# Patient Record
(demographics unavailable — no encounter records)

---

## 2025-03-18 NOTE — PLAN
[FreeTextEntry1] : Check labs as above. He would like to check his testosterone level.  Will try Propranolol for PRN use for the physical symptoms of anxiety.  Reviewed age-appropriate preventive screening tests with patient.  Discussed clean eating (eg Mediterranean style eating plan) and regular exercise/staying as physically active as possible.  Include balance exercises and strength training and core strengthening exercises for bone health and to decrease risk for falls.  Reviewed importance of good self care (e.g. meditation, yoga, adequate rest, regular exercise, magnesium, clean eating, etc.).  Follow up for next physical in one to two years.

## 2025-03-18 NOTE — ASSESSMENT
[FreeTextEntry1] : SHANDA KIM is a 24 year old male here for a physical exam.  He has a history of anxiety. He was taking Sertraline in the past but has stopped. He does not feel that he needs a daily medication. He is not interested in a benzodiazepine but would like to try a beta blocker PRN for the physical symptoms of anxiety.  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

## 2025-03-18 NOTE — HISTORY OF PRESENT ILLNESS
[FreeTextEntry1] : SHANDA KIM is a 24 year old male here for a physical exam. [de-identified] : His last physical exam was 7/2023  Vaccines: Tetanus is up to date, 7/25/23  His last dentist visit was less than one year ago His last eye doctor appointment was less than one year ago His last dermatologist visit was more than one year ago  His diet is healthy overall Exercise: weights and cardio

## 2025-03-18 NOTE — HISTORY OF PRESENT ILLNESS
[FreeTextEntry1] : SHANDA KIM is a 24 year old male here for a physical exam. [de-identified] : His last physical exam was 7/2023  Vaccines: Tetanus is up to date, 7/25/23  His last dentist visit was less than one year ago His last eye doctor appointment was less than one year ago His last dermatologist visit was more than one year ago  His diet is healthy overall Exercise: weights and cardio